# Patient Record
Sex: MALE | Race: WHITE | URBAN - METROPOLITAN AREA
[De-identification: names, ages, dates, MRNs, and addresses within clinical notes are randomized per-mention and may not be internally consistent; named-entity substitution may affect disease eponyms.]

---

## 2017-04-12 ENCOUNTER — OFFICE VISIT (OUTPATIENT)
Dept: FAMILY MEDICINE CLINIC | Age: 52
End: 2017-04-12

## 2017-04-12 VITALS
SYSTOLIC BLOOD PRESSURE: 156 MMHG | BODY MASS INDEX: 39.17 KG/M2 | HEART RATE: 85 BPM | OXYGEN SATURATION: 95 % | RESPIRATION RATE: 20 BRPM | HEIGHT: 75 IN | WEIGHT: 315 LBS | DIASTOLIC BLOOD PRESSURE: 97 MMHG | TEMPERATURE: 97.4 F

## 2017-04-12 DIAGNOSIS — I10 ESSENTIAL HYPERTENSION: ICD-10-CM

## 2017-04-12 DIAGNOSIS — Z13.1 SCREENING FOR DIABETES MELLITUS: ICD-10-CM

## 2017-04-12 DIAGNOSIS — Z00.00 ROUTINE GENERAL MEDICAL EXAMINATION AT A HEALTH CARE FACILITY: ICD-10-CM

## 2017-04-12 DIAGNOSIS — Z13.220 SCREENING FOR HYPERLIPIDEMIA: ICD-10-CM

## 2017-04-12 DIAGNOSIS — Z12.5 PROSTATE CANCER SCREENING: ICD-10-CM

## 2017-04-12 DIAGNOSIS — Z00.00 ROUTINE GENERAL MEDICAL EXAMINATION AT A HEALTH CARE FACILITY: Primary | ICD-10-CM

## 2017-04-12 DIAGNOSIS — K42.9 UMBILICAL HERNIA WITHOUT OBSTRUCTION AND WITHOUT GANGRENE: ICD-10-CM

## 2017-04-12 RX ORDER — LISINOPRIL AND HYDROCHLOROTHIAZIDE 10; 12.5 MG/1; MG/1
1 TABLET ORAL
Qty: 30 TAB | Refills: 1 | Status: SHIPPED | OUTPATIENT
Start: 2017-04-12 | End: 2017-05-12 | Stop reason: DRUGHIGH

## 2017-04-12 NOTE — LETTER
5/18/2017 11:52 AM    Mr. Sky Gurrola  [address] [address]      Dear Sky Gurrola:    Please find your most recent results below. Resulted Orders   CBC WITH AUTOMATED DIFF   Result Value Ref Range    WBC 7.7 3.4 - 10.8 x10E3/uL    RBC 5.12 4.14 - 5.80 x10E6/uL    HGB 13.6 12.6 - 17.7 g/dL    HCT 41.9 37.5 - 51.0 %    MCV 82 79 - 97 fL    MCH 26.6 26.6 - 33.0 pg    MCHC 32.5 31.5 - 35.7 g/dL    RDW 15.4 12.3 - 15.4 %    PLATELET 164 695 - 711 x10E3/uL    NEUTROPHILS 62 %    Lymphocytes 29 %    MONOCYTES 7 %    EOSINOPHILS 2 %    BASOPHILS 0 %    ABS. NEUTROPHILS 4.8 1.4 - 7.0 x10E3/uL    Abs Lymphocytes 2.2 0.7 - 3.1 x10E3/uL    ABS. MONOCYTES 0.6 0.1 - 0.9 x10E3/uL    ABS. EOSINOPHILS 0.1 0.0 - 0.4 x10E3/uL    ABS. BASOPHILS 0.0 0.0 - 0.2 x10E3/uL    IMMATURE GRANULOCYTES 0 %    ABS. IMM.  GRANS. 0.0 0.0 - 0.1 x10E3/uL    Narrative    Performed at:  [address]  [id number]  : Cleveland Abdi MD, Phone:  [phone]   LIPID PANEL   Result Value Ref Range    Cholesterol, total 200 (H) 100 - 199 mg/dL    Triglyceride 70 0 - 149 mg/dL    HDL Cholesterol 55 >39 mg/dL    VLDL, calculated 14 5 - 40 mg/dL    LDL, calculated 131 (H) 0 - 99 mg/dL    Narrative    Performed at:  [address]  [id number]  : Cleveland Abdi MD, Phone:  [phone]   METABOLIC PANEL, COMPREHENSIVE   Result Value Ref Range    Glucose 105 (H) 65 - 99 mg/dL    BUN 13 6 - 24 mg/dL    Creatinine 0.80 0.76 - 1.27 mg/dL    GFR est non- >59 mL/min/1.73    GFR est  >59 mL/min/1.73    BUN/Creatinine ratio 16 9 - 20    Sodium 139 134 - 144 mmol/L    Potassium 4.5 3.5 - 5.2 mmol/L    Chloride 99 96 - 106 mmol/L    CO2 26 18 - 29 mmol/L    Calcium 9.2 8.7 - 10.2 mg/dL    Protein, total 7.2 6.0 - 8.5 g/dL    Albumin 4.1 3.5 - 5.5 g/dL    GLOBULIN, TOTAL 3.1 1.5 - 4.5 g/dL    A-G Ratio 1.3 1.2 - 2.2 Bilirubin, total 0.4 0.0 - 1.2 mg/dL    Alk. phosphatase 61 39 - 117 IU/L    AST (SGOT) 20 0 - 40 IU/L    ALT (SGPT) 27 0 - 44 IU/L    Narrative    Performed at:  [address]  [id number]  : Cleveland Abdi MD, Phone:  [phone]   TSH 3RD GENERATION   Result Value Ref Range    TSH 1.340 0.450 - 4.500 uIU/mL    Narrative    Performed at:  [address]  [id number]  : Cleveland Abdi MD, Phone:  [phone]   URINALYSIS W/ RFLX MICROSCOPIC   Result Value Ref Range    Specific Gravity 1.017 1.005 - 1.030    pH (UA) 7.0 5.0 - 7.5    Color Yellow Yellow    Appearance Clear Clear    Leukocyte Esterase Negative Negative    Protein Negative Negative/Trace    Glucose Negative Negative    Ketone Negative Negative    Blood Negative Negative    Bilirubin Negative Negative    Urobilinogen 1.0 0.2 - 1.0 mg/dL    Nitrites Negative Negative    Microscopic Examination Comment       Comment:      Microscopic not indicated and not performed. Narrative    Performed at:  [address]  [id number]  : Cleveland Abdi MD, Phone:  [phone]   HEMOGLOBIN A1C WITH EAG   Result Value Ref Range    Hemoglobin A1c 5.8 (H) 4.8 - 5.6 %      Comment:               Pre-diabetes: 5.7 - 6.4           Diabetes: >6.4           Glycemic control for adults with diabetes: <7.0      Estimated average glucose 120 mg/dL    Narrative    Performed at:  [address]  [id number]  : Cleveland Abdi MD, Phone:  [phone]   PROSTATE SPECIFIC AG (PSA)   Result Value Ref Range    Prostate Specific Ag 0.5 0.0 - 4.0 ng/mL      Comment:      Roche ECLIA methodology. According to the American Urological Association, Serum PSA should  decrease and remain at undetectable levels after radical  prostatectomy.  The AUA defines biochemical recurrence as an initial  PSA value 0.2 ng/mL or greater followed by a subsequent confirmatory  PSA value 0.2 ng/mL or greater. Values obtained with different assay methods or kits cannot be used  interchangeably. Results cannot be interpreted as absolute evidence  of the presence or absence of malignant disease. Narrative    Performed at:  [address]  [id number]  : Ladonna Tracey MD, Phone:  [phone]   CVD REPORT   Result Value Ref Range    INTERPRETATION Note       Comment:      Supplement report is available. Narrative    Performed at:  [address] [place] [address] A  [address] [place] [address]  [id number]  : Torrie Sanchez PhD, Phone:  [phone]       RECOMMENDATIONS:  Per Dr. Eloise Lara,    Cholesterol is slightly elevated, need diet. Glucose/a1c are mildly elevated, pre-diabetes, need diet/lose  Weight.  Will discuss next visit     Please call me if you have any questions: [phone]        Sincerely,  Francisco Maldonado MD

## 2017-04-12 NOTE — PROGRESS NOTES
HISTORY OF PRESENT ILLNESS  Kristian Broussard is a 54 y.o. male. HPI  New pt  HTN, not controlled, he has H/O HTN and was on meds before but lost his insurance    M obesity, BMI 59, stated he just started dieting and lost 5 lbs in the last 3 weeks    C/o abdominal hernia, for more than a year, painful at times    Review of Systems   Constitutional: Negative for chills and fever. Eyes: Negative for blurred vision. Respiratory: Negative for cough, shortness of breath and wheezing. Cardiovascular: Negative for chest pain, palpitations, orthopnea and PND. Gastrointestinal: Negative for diarrhea, heartburn and nausea. Musculoskeletal: Negative for falls. Skin: Negative for rash. Neurological: Negative for dizziness, tingling, weakness and headaches. Psychiatric/Behavioral: Negative for depression. The patient does not have insomnia. All other systems reviewed and are negative. Physical Exam   Constitutional: He is oriented to person, place, and time. He appears well-developed and well-nourished. No distress. HENT:   Head: Normocephalic and atraumatic. Right Ear: External ear normal.   Left Ear: External ear normal.   Nose: Nose normal.   Mouth/Throat: Oropharynx is clear and moist. No oropharyngeal exudate. Eyes: Conjunctivae and EOM are normal. Pupils are equal, round, and reactive to light. Neck: Normal range of motion. Neck supple. No thyromegaly present. Cardiovascular: Normal rate, regular rhythm, normal heart sounds and intact distal pulses. No murmur heard. Pulmonary/Chest: Effort normal and breath sounds normal. He has no wheezes. He has no rales. Abdominal: Soft. Bowel sounds are normal. He exhibits no distension and no mass. There is tenderness in the periumbilical area. A hernia (umbilical, tender) is present. Musculoskeletal: Normal range of motion. He exhibits no edema. Lymphadenopathy:     He has no cervical adenopathy.    Neurological: He is alert and oriented to person, place, and time. Skin: Skin is warm and dry. No rash noted. He is not diaphoretic. Psychiatric: He has a normal mood and affect. His behavior is normal. Judgment normal.   Vitals reviewed. ASSESSMENT and PLAN  Severa Ingle was seen today for establish care and abdominal pain. Diagnoses and all orders for this visit:    Routine general medical examination at a health care facility  -     CBC WITH AUTOMATED DIFF; Future  -     LIPID PANEL; Future  -     METABOLIC PANEL, COMPREHENSIVE; Future  -     TSH 3RD GENERATION; Future  -     URINALYSIS W/ RFLX MICROSCOPIC; Future    Essential hypertension  -     CBC WITH AUTOMATED DIFF; Future  -     LIPID PANEL; Future  -     METABOLIC PANEL, COMPREHENSIVE; Future  -     TSH 3RD GENERATION; Future  -     lisinopril-hydroCHLOROthiazide (PRINZIDE, ZESTORETIC) 10-12.5 mg per tablet; Take 1 Tab by mouth every morning. Umbilical hernia without obstruction and without gangrene  -     REFERRAL TO SURGERY    Screening for diabetes mellitus  -     METABOLIC PANEL, COMPREHENSIVE; Future  -     HEMOGLOBIN A1C WITH EAG; Future    Screening for hyperlipidemia  -     LIPID PANEL;  Future    Prostate cancer screening  -     PSA - PROSTATE SPECIFIC AG; Future    rtc 1 mo

## 2017-04-12 NOTE — MR AVS SNAPSHOT
Visit Information     Date & Time Provider Department Dept. Phone Encounter #    4/12/2017  3:15 PM Armida Alpers, MD [address] [place] [address] [phone] [id number]      Follow-up Instructions     Return in about 1 month (around 5/12/2017). Upcoming Health Maintenance        Date Due    DTaP/Tdap/Td series (1 - Tdap) 7/11/1988    INFLUENZA AGE 9 TO ADULT 8/1/2016      Allergies as of 4/12/2017  Review Complete On: 4/12/2017 By: Armida Alpers, MD    No Known Allergies      Current Immunizations  Never Reviewed    No immunizations on file.        Not reviewed this visit   You Were Diagnosed With        Codes Comments    Routine general medical examination at a health care facility    -  Primary ICD-10-CM: Z00.00  ICD-9-CM: V70.0     Essential hypertension     ICD-10-CM: I10  XSM-2-TY: 744.0     Umbilical hernia without obstruction and without gangrene     ICD-10-CM: K42.9  ICD-9-CM: 553.1     Screening for diabetes mellitus     ICD-10-CM: Z13.1  ICD-9-CM: V77.1     Screening for hyperlipidemia     ICD-10-CM: Z13.220  ICD-9-CM: V77.91     Prostate cancer screening     ICD-10-CM: Z12.5  ICD-9-CM: V76.44       Vitals     BP Pulse Temp Resp Height(growth percentile) Weight(growth percentile)    (!) 156/97 (BP 1 Location: Right arm, BP Patient Position: Sitting) 85 97.4 °F (36.3 °C) (Oral) 20 6' 3\" (1.905 m) (!) 473 lb (214.6 kg)    SpO2 BMI Smoking Status             95% 59.12 kg/m2 Former Smoker       Vitals History      BMI and BSA Data     Body Mass Index Body Surface Area    59.12 kg/m 2 3.37 m 2         Preferred Pharmacy       Pharmacy Name Phone    [facility] [address], [address] [facility] [phone]         Your Updated Medication List          This list is accurate as of: 4/12/17  3:55 PM.  Always use your most recent med list.                lisinopril-hydroCHLOROthiazide 10-12.5 mg per tablet   Commonly known as: PRINZIDE, ZESTORETIC   Take 1 Tab by mouth every morning. Prescriptions Sent to Pharmacy        Refills    lisinopril-hydroCHLOROthiazide (PRINZIDE, ZESTORETIC) 10-12.5 mg per tablet 1    Sig: Take 1 Tab by mouth every morning. Class: Normal    [facility] [address] [place] [address], [address] AT [address] [place] [address] #: [phone]    Route: Oral      We Performed the Following     REFERRAL TO SURGERY [TMB864 Custom]     Comments:    Please evaluate patient for Umbilical hernia, tender      Follow-up Instructions     Return in about 1 month (around 5/12/2017). To-Do List     04/12/2017     Lab:  CBC WITH AUTOMATED DIFF        04/12/2017     Lab:  HEMOGLOBIN A1C WITH EAG        04/12/2017     Lab:  LIPID PANEL        04/12/2017     Lab:  METABOLIC PANEL, COMPREHENSIVE        04/12/2017     Lab:  PROSTATE SPECIFIC AG (PSA)        04/12/2017     Lab:  TSH 3RD GENERATION        04/12/2017     Lab:  URINALYSIS W/ RFLX MICROSCOPIC          Referral Information     Referral ID Referred By Referred To       [id number] Emiliano Bravo MD       [address] [place] [address]       Phone: [phone]       Fax: [phone]         Visits Status Start Date End Date    1 New Request 4/12/17 4/12/18    If your referral has a status of pending review or denied, additional information will be sent to support the outcome of this decision. Introducing [facility] HE[facility] SERVICES! [employer] introduces [employer] patient portal. Now you can access parts of your medical record, email your doctor's office, and request medication refills online. 1. In your internet browser, go to [email or web address]   2. Click on the First Time User? Click Here link in the Sign In box. You will see the New Member Sign Up page. 3. Enter your [employer] Access Code exactly as it appears below.  You will not need to use this code after youve completed the sign-up process. If you do not sign up before the expiration date, you must request a new code. · [employer] Access Code: [email or web address]  Expires: 7/11/2017  3:55 PM    4. Enter the last four digits of your Social Security Number (xxxx) and Date of Birth (mm/dd/yyyy) as indicated and click Submit. You will be taken to the next sign-up page. 5. Create a [employer] ID. This will be your [employer] login ID and cannot be changed, so think of one that is secure and easy to remember. 6. Create a [employer] password. You can change your password at any time. 7. Enter your Password Reset Question and Answer. This can be used at a later time if you forget your password. 8. Enter your e-mail address. You will receive e-mail notification when new information is available in [address]. 9. Click Sign Up. You can now view and download portions of your medical record. 10. Click the Download Summary menu link to download a portable copy of your medical information. If you have questions, please visit the Frequently Asked Questions section of the [employer] website. Remember, [employer] is NOT to be used for urgent needs. For medical emergencies, dial 911. Now available from your iPhone and Android! Please provide this summary of care documentation to your next provider. If you have any questions after today's visit, please call [phone].

## 2017-04-13 ENCOUNTER — TELEPHONE (OUTPATIENT)
Dept: SURGERY | Age: 52
End: 2017-04-13

## 2017-04-13 NOTE — TELEPHONE ENCOUNTER
Called the patient at [phone], left a message for him to call our office to schedule an appointment with Dr. Elayne Rodriguez. The patient was referred to us by Dr. Leela River to see Dr. Rody Griffin in regards to umbilical hernia    Dr. Gladis Joseph would like the patient to be seen as soon as possible.

## 2017-04-19 ENCOUNTER — DOCUMENTATION ONLY (OUTPATIENT)
Dept: SURGERY | Age: 52
End: 2017-04-19

## 2017-04-19 ENCOUNTER — OFFICE VISIT (OUTPATIENT)
Dept: SURGERY | Age: 52
End: 2017-04-19

## 2017-04-19 VITALS
DIASTOLIC BLOOD PRESSURE: 97 MMHG | TEMPERATURE: 95.9 F | HEART RATE: 85 BPM | BODY MASS INDEX: 39.17 KG/M2 | OXYGEN SATURATION: 97 % | SYSTOLIC BLOOD PRESSURE: 177 MMHG | HEIGHT: 75 IN | WEIGHT: 315 LBS | RESPIRATION RATE: 20 BRPM

## 2017-04-19 DIAGNOSIS — K42.9 UMBILICAL HERNIA WITHOUT OBSTRUCTION AND WITHOUT GANGRENE: Primary | ICD-10-CM

## 2017-04-19 DIAGNOSIS — E66.01 MORBID OBESITY DUE TO EXCESS CALORIES (HCC): ICD-10-CM

## 2017-04-19 NOTE — LETTER
4/19/2017 11:26 AM    Patient:  Abena Peralta   YOB: 1965  Date of Visit: 4/19/2017      Armida Alpers, MD  [address] [place] [id number]  VIA In Basket       Dear Armida Alpers, MD,      Thank you for referring Mr. Nga Corado to [place] [address] for evaluation and treatment. Below are the relevant portions of my assessment and plan of care. Thank you very much for your referral of Mr. Nga Corado. If you have questions, please do not hesitate to call me. I look forward to following Mr. Bryant Lema along with you and will keep you updated as to his progress.            Sincerely,      Suyapa Beckett MD

## 2017-04-19 NOTE — MR AVS SNAPSHOT
Visit Information     Date & Time Provider Department Dept. Phone Encounter #    4/19/2017 11:15 AM Christina Jimenez MD [address] [phone] [id number]      Your Appointments     5/12/2017 11:30 AM   Follow Up with Nicky Sanches MD   [address])   Appt Note: bp f/u per patient request    [address] [facility] [address]           [address] Maintenance        Date Due    DTaP/Tdap/Td series (1 - Tdap) 7/11/1988    INFLUENZA AGE 9 TO ADULT 8/1/2016      Allergies as of 4/19/2017  Review Complete On: 4/19/2017 By: April Elmore    No Known Allergies      Current Immunizations  Never Reviewed    No immunizations on file. Not reviewed this visit   Vitals     BP Pulse Temp Resp Height(growth percentile) Weight(growth percentile)    (!) 177/97 (BP 1 Location: Right arm, BP Patient Position: Sitting) 85 95.9 °F (35.5 °C) (Oral) 20 6' 3\" (1.905 m) (!) 470 lb (213.2 kg)    SpO2 BMI Smoking Status             97% 58.75 kg/m2 Former Smoker         BMI and BSA Data     Body Mass Index Body Surface Area    58.75 kg/m 2 3.36 m 2         Preferred Pharmacy       Pharmacy Name Phone    [facility], [address] [place] [address] AT [address] [place] [address] [phone]         Your Updated Medication List          This list is accurate as of: 4/19/17 11:48 AM.  Always use your most recent med list.                lisinopril-hydroCHLOROthiazide 10-12.5 mg per tablet   Commonly known as:  PRINZIDE, ZESTORETIC   Take 1 Tab by mouth every morning. Patient Instructions    If you have any questions or concerns about today's appointment, the verbal and/or written instructions you were given for follow up care, please call our office at [phone].     [facility] [place]  [address] [place], [place] [phone] office  [phone]       Introducing hospitals & HEALTH SERVICES! Judi Garcia introduces [employer] patient portal. Now you can access parts of your medical record, email your doctor's office, and request medication refills online. 1. In your internet browser, go to [email or web address]   2. Click on the First Time User? Click Here link in the Sign In box. You will see the New Member Sign Up page. 3. Enter your [employer] Access Code exactly as it appears below. You will not need to use this code after youve completed the sign-up process. If you do not sign up before the expiration date, you must request a new code. · [employer] Access Code: [email or web address]  Expires: 7/11/2017  3:55 PM    4. Enter the last four digits of your Social Security Number (xxxx) and Date of Birth (mm/dd/yyyy) as indicated and click Submit. You will be taken to the next sign-up page. 5. Create a [employer] ID. This will be your [employer] login ID and cannot be changed, so think of one that is secure and easy to remember. 6. Create a [employer] password. You can change your password at any time. 7. Enter your Password Reset Question and Answer. This can be used at a later time if you forget your password. 8. Enter your e-mail address. You will receive e-mail notification when new information is available in [address] 9. Click Sign Up. You can now view and download portions of your medical record. 10. Click the Download Summary menu link to download a portable copy of your medical information. If you have questions, please visit the Frequently Asked Questions section of the [employer] website. Remember, [employer] is NOT to be used for urgent needs. For medical emergencies, dial 911. Now available from your iPhone and Android! Please provide this summary of care documentation to your next provider.          Your primary care clinician is listed as Alex Guzman. If you have any questions after today's visit, please call [phone].

## 2017-04-19 NOTE — PROGRESS NOTES
Patient is a 54 y.o. male who presents for a surgical evaluation of an umbilical hernia. Patient scores his umbilical pain as a 9/03.

## 2017-04-19 NOTE — PROGRESS NOTES
Called pt insurance to see if he had the bariatric rider for morbid obesity. Pt does not have the benefit.

## 2017-04-19 NOTE — PATIENT INSTRUCTIONS
If you have any questions or concerns about today's appointment, the verbal and/or written instructions you were given for follow up care, please call our office at [phone].     [facility] - [address]    [phone] office  [phone]

## 2017-05-12 ENCOUNTER — TELEPHONE (OUTPATIENT)
Dept: SURGERY | Age: 52
End: 2017-05-12

## 2017-05-12 ENCOUNTER — OFFICE VISIT (OUTPATIENT)
Dept: FAMILY MEDICINE CLINIC | Age: 52
End: 2017-05-12

## 2017-05-12 ENCOUNTER — HOSPITAL ENCOUNTER (OUTPATIENT)
Dept: LAB | Age: 52
Discharge: HOME | End: 2017-05-12

## 2017-05-12 VITALS
SYSTOLIC BLOOD PRESSURE: 148 MMHG | TEMPERATURE: 96.5 F | OXYGEN SATURATION: 97 % | DIASTOLIC BLOOD PRESSURE: 84 MMHG | HEIGHT: 75 IN | RESPIRATION RATE: 18 BRPM | WEIGHT: 315 LBS | BODY MASS INDEX: 39.17 KG/M2 | HEART RATE: 79 BPM

## 2017-05-12 DIAGNOSIS — E66.01 MORBID OBESITY, UNSPECIFIED OBESITY TYPE (HCC): ICD-10-CM

## 2017-05-12 DIAGNOSIS — J06.9 ACUTE URI: ICD-10-CM

## 2017-05-12 DIAGNOSIS — I10 ESSENTIAL HYPERTENSION: Primary | ICD-10-CM

## 2017-05-12 PROCEDURE — 99001 SPECIMEN HANDLING PT-LAB: CPT | Performed by: INTERNAL MEDICINE

## 2017-05-12 RX ORDER — BENZONATATE 200 MG/1
200 CAPSULE ORAL
Qty: 20 CAP | Refills: 0 | Status: SHIPPED | OUTPATIENT
Start: 2017-05-12 | End: 2017-06-16

## 2017-05-12 RX ORDER — LISINOPRIL AND HYDROCHLOROTHIAZIDE 12.5; 2 MG/1; MG/1
1 TABLET ORAL
Qty: 30 TAB | Refills: 2 | Status: SHIPPED | OUTPATIENT
Start: 2017-05-12 | End: 2017-06-16 | Stop reason: SINTOL

## 2017-05-12 NOTE — PROGRESS NOTES
1. Have you been to the ER, urgent care clinic since your last visit? Hospitalized since your last visit? No    2. Have you seen or consulted any other health care providers outside of the [address] [place] [address] since your last visit? Include any pap smears or colon screening.  No

## 2017-05-12 NOTE — PROGRESS NOTES
HISTORY OF PRESENT ILLNESS  Evette Barney is a 54 y.o. male. HPI  HTN, not well controlled yet, he is on Lisinopril daily, he did not get his labs done yet! C/o cough and congestions, started a week ago, improving, no fever or chills, no wheezing , coughing clear sputum    Morbid Obesity, improving, he lost 5 lbs over 4 weeks, he was evaluated for bariatic surgery but his insurance does not cover it, his BMI 58  Review of Systems   Constitutional: Negative for chills and fever. HENT: Negative for ear pain and sore throat. Respiratory: Positive for cough. Negative for shortness of breath and wheezing. Cardiovascular: Negative for chest pain. Gastrointestinal: Negative for abdominal pain and nausea. Neurological: Negative for dizziness and headaches. Physical Exam   Constitutional: He appears well-developed and well-nourished. HENT:   Right Ear: Tympanic membrane normal.   Left Ear: Tympanic membrane normal.   Mouth/Throat: Posterior oropharyngeal erythema present. No oropharyngeal exudate. Neck: Neck supple. No thyromegaly present. Cardiovascular: Normal rate, regular rhythm and normal heart sounds. Pulmonary/Chest: Effort normal and breath sounds normal. He has no wheezes. He has no rales. Lymphadenopathy:     He has no cervical adenopathy. Vitals reviewed. ASSESSMENT and PLAN  Sherryle Crochet was seen today for hypertension. Diagnoses and all orders for this visit:    Essential hypertension, not controlled, increase lisinopril dose  -     lisinopril-hydroCHLOROthiazide (PRINZIDE, ZESTORETIC) 20-12.5 mg per tablet; Take 1 Tab by mouth every morning. Acute URI, most likely viral  -     benzonatate (TESSALON) 200 mg capsule; Take 1 Cap by mouth three (3) times daily as needed for Cough.     Morbid obesity, unspecified obesity type, improving, advised to continue diet, try to lose 2 lbs a week or more    Labs today  rtc 4 weeks

## 2017-05-13 LAB
ALBUMIN SERPL-MCNC: 4.1 G/DL (ref 3.5–5.5)
ALBUMIN/GLOB SERPL: 1.3 {RATIO} (ref 1.2–2.2)
ALP SERPL-CCNC: 61 IU/L (ref 39–117)
ALT SERPL-CCNC: 27 IU/L (ref 0–44)
APPEARANCE UR: CLEAR
AST SERPL-CCNC: 20 IU/L (ref 0–40)
BASOPHILS # BLD AUTO: 0 X10E3/UL (ref 0–0.2)
BASOPHILS NFR BLD AUTO: 0 %
BILIRUB SERPL-MCNC: 0.4 MG/DL (ref 0–1.2)
BILIRUB UR QL STRIP: NEGATIVE
BUN SERPL-MCNC: 13 MG/DL (ref 6–24)
BUN/CREAT SERPL: 16 (ref 9–20)
CALCIUM SERPL-MCNC: 9.2 MG/DL (ref 8.7–10.2)
CHLORIDE SERPL-SCNC: 99 MMOL/L (ref 96–106)
CHOLEST SERPL-MCNC: 200 MG/DL (ref 100–199)
CO2 SERPL-SCNC: 26 MMOL/L (ref 18–29)
COLOR UR: YELLOW
CREAT SERPL-MCNC: 0.8 MG/DL (ref 0.76–1.27)
EOSINOPHIL # BLD AUTO: 0.1 X10E3/UL (ref 0–0.4)
EOSINOPHIL NFR BLD AUTO: 2 %
ERYTHROCYTE [DISTWIDTH] IN BLOOD BY AUTOMATED COUNT: 15.4 % (ref 12.3–15.4)
EST. AVERAGE GLUCOSE BLD GHB EST-MCNC: 120 MG/DL
GLOBULIN SER CALC-MCNC: 3.1 G/DL (ref 1.5–4.5)
GLUCOSE SERPL-MCNC: 105 MG/DL (ref 65–99)
GLUCOSE UR QL: NEGATIVE
HBA1C MFR BLD: 5.8 % (ref 4.8–5.6)
HCT VFR BLD AUTO: 41.9 % (ref 37.5–51)
HDLC SERPL-MCNC: 55 MG/DL
HGB BLD-MCNC: 13.6 G/DL (ref 12.6–17.7)
HGB UR QL STRIP: NEGATIVE
IMM GRANULOCYTES # BLD: 0 X10E3/UL (ref 0–0.1)
IMM GRANULOCYTES NFR BLD: 0 %
INTERPRETATION, 910389: NORMAL
KETONES UR QL STRIP: NEGATIVE
LDLC SERPL CALC-MCNC: 131 MG/DL (ref 0–99)
LEUKOCYTE ESTERASE UR QL STRIP: NEGATIVE
LYMPHOCYTES # BLD AUTO: 2.2 X10E3/UL (ref 0.7–3.1)
LYMPHOCYTES NFR BLD AUTO: 29 %
MCH RBC QN AUTO: 26.6 PG (ref 26.6–33)
MCHC RBC AUTO-ENTMCNC: 32.5 G/DL (ref 31.5–35.7)
MCV RBC AUTO: 82 FL (ref 79–97)
MICRO URNS: NORMAL
MONOCYTES # BLD AUTO: 0.6 X10E3/UL (ref 0.1–0.9)
MONOCYTES NFR BLD AUTO: 7 %
NEUTROPHILS # BLD AUTO: 4.8 X10E3/UL (ref 1.4–7)
NEUTROPHILS NFR BLD AUTO: 62 %
NITRITE UR QL STRIP: NEGATIVE
PH UR STRIP: 7 [PH] (ref 5–7.5)
PLATELET # BLD AUTO: 237 X10E3/UL (ref 150–379)
POTASSIUM SERPL-SCNC: 4.5 MMOL/L (ref 3.5–5.2)
PROT SERPL-MCNC: 7.2 G/DL (ref 6–8.5)
PROT UR QL STRIP: NEGATIVE
PSA SERPL-MCNC: 0.5 NG/ML (ref 0–4)
RBC # BLD AUTO: 5.12 X10E6/UL (ref 4.14–5.8)
SODIUM SERPL-SCNC: 139 MMOL/L (ref 134–144)
SP GR UR: 1.02 (ref 1–1.03)
TRIGL SERPL-MCNC: 70 MG/DL (ref 0–149)
TSH SERPL DL<=0.005 MIU/L-ACNC: 1.34 UIU/ML (ref 0.45–4.5)
UROBILINOGEN UR STRIP-MCNC: 1 MG/DL (ref 0.2–1)
VLDLC SERPL CALC-MCNC: 14 MG/DL (ref 5–40)
WBC # BLD AUTO: 7.7 X10E3/UL (ref 3.4–10.8)

## 2017-05-15 NOTE — PROGRESS NOTES
Cholesterol is slightly elevated, need diet    Glucose/a1c are mildly elevated, pre-diabetes, need diet/lose weight  Will discuss next visit

## 2017-06-16 ENCOUNTER — OFFICE VISIT (OUTPATIENT)
Dept: FAMILY MEDICINE CLINIC | Age: 52
End: 2017-06-16

## 2017-06-16 VITALS
RESPIRATION RATE: 16 BRPM | OXYGEN SATURATION: 95 % | DIASTOLIC BLOOD PRESSURE: 110 MMHG | BODY MASS INDEX: 39.17 KG/M2 | WEIGHT: 315 LBS | HEART RATE: 84 BPM | TEMPERATURE: 97.9 F | SYSTOLIC BLOOD PRESSURE: 160 MMHG | HEIGHT: 75 IN

## 2017-06-16 DIAGNOSIS — E78.00 PURE HYPERCHOLESTEROLEMIA: ICD-10-CM

## 2017-06-16 DIAGNOSIS — I10 ESSENTIAL HYPERTENSION: Primary | ICD-10-CM

## 2017-06-16 DIAGNOSIS — R73.03 PRE-DIABETES: ICD-10-CM

## 2017-06-16 RX ORDER — AMLODIPINE BESYLATE 5 MG/1
5 TABLET ORAL DAILY
Qty: 30 TAB | Refills: 2 | Status: SHIPPED | OUTPATIENT
Start: 2017-06-16

## 2017-06-16 RX ORDER — VALSARTAN AND HYDROCHLOROTHIAZIDE 160; 12.5 MG/1; MG/1
1 TABLET, FILM COATED ORAL
Qty: 30 TAB | Refills: 2 | Status: SHIPPED | OUTPATIENT
Start: 2017-06-16

## 2017-06-16 RX ORDER — METFORMIN HYDROCHLORIDE 500 MG/1
500 TABLET, EXTENDED RELEASE ORAL
Qty: 30 TAB | Refills: 2 | Status: SHIPPED | OUTPATIENT
Start: 2017-06-16

## 2017-06-16 NOTE — PROGRESS NOTES
Bill Rodriguez is a 54 y.o. male  Pt here today for follow up visit. 1. Have you been to the ER, urgent care clinic since your last visit? Hospitalized since your last visit? No    2. Have you seen or consulted any other health care providers outside of the [address] [place] [address] since your last visit? Include any pap smears or colon screening.  No

## 2017-06-16 NOTE — PROGRESS NOTES
HISTORY OF PRESENT ILLNESS  Cam Freire is a 54 y.o. male. HPI  HTN, not controlled, bp is elevated, he stopped lisinopril due to severe dry cough  HLD, his recent labs noted, LDl was 131, discussed statins, diet and weight loss    Pre diabetes, stable, discussed last labs, a1c 5.8, glucose 105, discussed weight loss and metformin    Morbid obesity, he lost 7 lbs since last visit 4 weeks ago  Review of Systems   Constitutional: Negative for chills and fever. Respiratory: Negative for cough, shortness of breath and wheezing. Cardiovascular: Negative for chest pain, palpitations and leg swelling. Gastrointestinal: Negative for abdominal pain and nausea. Neurological: Negative for dizziness and headaches. Physical Exam   Constitutional: He appears well-developed. Neck: No thyromegaly present. Cardiovascular: Normal rate, regular rhythm and normal heart sounds. Pulmonary/Chest: Effort normal and breath sounds normal.   Abdominal: Soft. There is no tenderness. Musculoskeletal: He exhibits no edema. Vitals reviewed. ASSESSMENT and PLAN  Cheli Nguyễn was seen today for medication evaluation. Diagnoses and all orders for this visit:    Essential hypertension, not controlled  -     valsartan-hydroCHLOROthiazide (DIOVAN-HCT) 160-12.5 mg per tablet; Take 1 Tab by mouth every morning.  -     amLODIPine (NORVASC) 5 mg tablet; Take 1 Tab by mouth daily. Pure hypercholesterolemia, not controlled, he wants to diet/lose weight for now, discussed diet, info given    Pre-diabetes, start:  -     metFORMIN ER (GLUCOPHAGE XR) 500 mg tablet; Take 1 Tab by mouth daily (with dinner).     Morbid obesity, improving slowly, continue weight loss, he lost about 2 lbs per week    Lab Results   Component Value Date/Time    Sodium 139 05/12/2017 11:46 AM    Potassium 4.5 05/12/2017 11:46 AM    Chloride 99 05/12/2017 11:46 AM    CO2 26 05/12/2017 11:46 AM    Glucose 105 05/12/2017 11:46 AM    BUN 13 05/12/2017 11:46 AM    Creatinine 0.80 05/12/2017 11:46 AM    BUN/Creatinine ratio 16 05/12/2017 11:46 AM    GFR est  05/12/2017 11:46 AM    GFR est non- 05/12/2017 11:46 AM    Calcium 9.2 05/12/2017 11:46 AM    Bilirubin, total 0.4 05/12/2017 11:46 AM    AST (SGOT) 20 05/12/2017 11:46 AM    Alk.  phosphatase 61 05/12/2017 11:46 AM    Protein, total 7.2 05/12/2017 11:46 AM    Albumin 4.1 05/12/2017 11:46 AM    A-G Ratio 1.3 05/12/2017 11:46 AM    ALT (SGPT) 27 05/12/2017 11:46 AM     Lab Results   Component Value Date/Time    Cholesterol, total 200 05/12/2017 11:46 AM    HDL Cholesterol 55 05/12/2017 11:46 AM    LDL, calculated 131 05/12/2017 11:46 AM    VLDL, calculated 14 05/12/2017 11:46 AM    Triglyceride 70 05/12/2017 11:46 AM       rtc 1 mo